# Patient Record
Sex: FEMALE | Race: WHITE | NOT HISPANIC OR LATINO | URBAN - METROPOLITAN AREA
[De-identification: names, ages, dates, MRNs, and addresses within clinical notes are randomized per-mention and may not be internally consistent; named-entity substitution may affect disease eponyms.]

---

## 2024-02-20 ENCOUNTER — APPOINTMENT (OUTPATIENT)
Dept: OPHTHALMOLOGY | Facility: CLINIC | Age: 74
End: 2024-02-20
Payer: MEDICARE

## 2024-02-27 ENCOUNTER — OFFICE VISIT (OUTPATIENT)
Dept: OPHTHALMOLOGY | Facility: CLINIC | Age: 74
End: 2024-02-27
Payer: MEDICARE

## 2024-02-27 DIAGNOSIS — H40.10X0 OPEN-ANGLE GLAUCOMA OF BOTH EYES, UNSPECIFIED GLAUCOMA STAGE, UNSPECIFIED OPEN-ANGLE GLAUCOMA TYPE: Primary | ICD-10-CM

## 2024-02-27 DIAGNOSIS — H53.40 UNSPECIFIED VISUAL FIELD DEFECTS: ICD-10-CM

## 2024-02-27 DIAGNOSIS — H40.89 OTHER GLAUCOMA OF BOTH EYES: ICD-10-CM

## 2024-02-27 PROCEDURE — 92083 EXTENDED VISUAL FIELD XM: CPT | Performed by: OPHTHALMOLOGY

## 2024-02-27 PROCEDURE — 99212 OFFICE O/P EST SF 10 MIN: CPT | Performed by: OPHTHALMOLOGY

## 2024-02-27 PROCEDURE — 92133 CPTRZD OPH DX IMG PST SGM ON: CPT | Performed by: OPHTHALMOLOGY

## 2024-02-27 RX ORDER — LATANOPROST 50 UG/ML
SOLUTION/ DROPS OPHTHALMIC NIGHTLY
COMMUNITY
Start: 2023-06-27

## 2024-02-27 ASSESSMENT — REFRACTION_MANIFEST
METHOD_AUTOREFRACTION: 1
OS_CYLINDER: -2.00
OS_SPHERE: -5.00
OD_ADD: +2.50
OS_ADD: +2.50
OD_SPHERE: -3.50
OD_CYLINDER: -1.25
OS_AXIS: 092
OD_AXIS: 074

## 2024-02-27 ASSESSMENT — REFRACTION_WEARINGRX
OD_SPHERE: -5.75
OD_AXIS: 070
OS_ADD: +2.50
OS_SPHERE: -6.25
OS_AXIS: 110
OS_CYLINDER: -0.50
OD_ADD: +2.50
OD_CYLINDER: -0.50

## 2024-02-27 ASSESSMENT — PACHYMETRY
OD_CT(UM): 532
OS_CT(UM): 514

## 2024-02-27 ASSESSMENT — VISUAL ACUITY
OS_CC: 20/30
CORRECTION_TYPE: GLASSES
OS_PH_CC: 20/25-2
OD_CC: 20/100-2
METHOD: SNELLEN - LINEAR

## 2024-02-27 ASSESSMENT — SLIT LAMP EXAM - LIDS
COMMENTS: NORMAL
COMMENTS: NORMAL

## 2024-02-27 ASSESSMENT — CONF VISUAL FIELD: COMMENTS: SEE HVF

## 2024-02-27 ASSESSMENT — EXTERNAL EXAM - LEFT EYE: OS_EXAM: NORMAL

## 2024-02-27 ASSESSMENT — TONOMETRY
OS_IOP_MMHG: 14
OD_IOP_MMHG: 14
IOP_METHOD: GOLDMANN APPLANATION

## 2024-02-27 ASSESSMENT — CUP TO DISC RATIO
OS_RATIO: 0.6
OD_RATIO: 0.55

## 2024-02-27 ASSESSMENT — GONIOSCOPY: OD_SUPERIOR: 3/360

## 2024-02-27 ASSESSMENT — EXTERNAL EXAM - RIGHT EYE: OD_EXAM: NORMAL

## 2024-08-27 ENCOUNTER — APPOINTMENT (OUTPATIENT)
Dept: OPHTHALMOLOGY | Facility: CLINIC | Age: 74
End: 2024-08-27
Payer: MEDICARE

## 2024-08-27 DIAGNOSIS — H40.10X2 OPEN-ANGLE GLAUCOMA OF BOTH EYES, MODERATE STAGE, UNSPECIFIED OPEN-ANGLE GLAUCOMA TYPE: Primary | ICD-10-CM

## 2024-08-27 PROCEDURE — 99214 OFFICE O/P EST MOD 30 MIN: CPT | Performed by: OPHTHALMOLOGY

## 2024-08-27 ASSESSMENT — CONF VISUAL FIELD
OS_INFERIOR_TEMPORAL_RESTRICTION: 0
OD_INFERIOR_NASAL_RESTRICTION: 0
OD_NORMAL: 1
OD_SUPERIOR_NASAL_RESTRICTION: 0
OS_INFERIOR_NASAL_RESTRICTION: 0
OS_SUPERIOR_TEMPORAL_RESTRICTION: 0
OS_SUPERIOR_NASAL_RESTRICTION: 0
OD_SUPERIOR_TEMPORAL_RESTRICTION: 0
OS_NORMAL: 1
OD_INFERIOR_TEMPORAL_RESTRICTION: 0

## 2024-08-27 ASSESSMENT — ENCOUNTER SYMPTOMS
CONSTITUTIONAL NEGATIVE: 0
GASTROINTESTINAL NEGATIVE: 0
HEMATOLOGIC/LYMPHATIC NEGATIVE: 0
PSYCHIATRIC NEGATIVE: 0
RESPIRATORY NEGATIVE: 0
ALLERGIC/IMMUNOLOGIC NEGATIVE: 0
NEUROLOGICAL NEGATIVE: 0
MUSCULOSKELETAL NEGATIVE: 0
CARDIOVASCULAR NEGATIVE: 0
ENDOCRINE NEGATIVE: 0
EYES NEGATIVE: 0

## 2024-08-27 ASSESSMENT — CUP TO DISC RATIO
OS_RATIO: 0.6
OD_RATIO: 0.55

## 2024-08-27 ASSESSMENT — PACHYMETRY
OS_CT(UM): 514
OD_CT(UM): 532

## 2024-08-27 ASSESSMENT — TONOMETRY
IOP_METHOD: GOLDMANN APPLANATION
OS_IOP_MMHG: 14
OD_IOP_MMHG: 14

## 2024-08-27 ASSESSMENT — SLIT LAMP EXAM - LIDS
COMMENTS: NORMAL
COMMENTS: NORMAL

## 2024-08-27 ASSESSMENT — VISUAL ACUITY
OS_CC: 20/40
CORRECTION_TYPE: GLASSES
OS_PH_CC: 20/25
OD_CC: 20/100
METHOD: SNELLEN - LINEAR
OD_CC+: -2
OS_CC+: -1

## 2024-08-27 ASSESSMENT — GONIOSCOPY
OD_SUPERIOR: 3/360
OS_SUPERIOR: 3/360

## 2024-08-27 ASSESSMENT — EXTERNAL EXAM - RIGHT EYE: OD_EXAM: NORMAL

## 2024-08-27 ASSESSMENT — EXTERNAL EXAM - LEFT EYE: OS_EXAM: NORMAL

## 2024-08-30 ENCOUNTER — OFFICE VISIT (OUTPATIENT)
Dept: OPHTHALMOLOGY | Facility: CLINIC | Age: 74
End: 2024-08-30
Payer: MEDICARE

## 2024-08-30 DIAGNOSIS — H40.10X0 OPEN-ANGLE GLAUCOMA OF BOTH EYES, UNSPECIFIED GLAUCOMA STAGE, UNSPECIFIED OPEN-ANGLE GLAUCOMA TYPE: ICD-10-CM

## 2024-08-30 DIAGNOSIS — H04.123 DRY EYE SYNDROME OF LACRIMAL GLAND, BILATERAL: Primary | ICD-10-CM

## 2024-08-30 DIAGNOSIS — H52.223 MYOPIA OF BOTH EYES WITH REGULAR ASTIGMATISM AND PRESBYOPIA: ICD-10-CM

## 2024-08-30 DIAGNOSIS — H52.13 MYOPIA OF BOTH EYES WITH REGULAR ASTIGMATISM AND PRESBYOPIA: ICD-10-CM

## 2024-08-30 DIAGNOSIS — H52.4 MYOPIA OF BOTH EYES WITH REGULAR ASTIGMATISM AND PRESBYOPIA: ICD-10-CM

## 2024-08-30 DIAGNOSIS — H25.13 AGE-RELATED NUCLEAR CATARACT OF BOTH EYES: ICD-10-CM

## 2024-08-30 PROCEDURE — 92015 DETERMINE REFRACTIVE STATE: CPT | Performed by: STUDENT IN AN ORGANIZED HEALTH CARE EDUCATION/TRAINING PROGRAM

## 2024-08-30 PROCEDURE — 99213 OFFICE O/P EST LOW 20 MIN: CPT | Performed by: STUDENT IN AN ORGANIZED HEALTH CARE EDUCATION/TRAINING PROGRAM

## 2024-08-30 ASSESSMENT — REFRACTION_MANIFEST
OS_AXIS: 067
OS_CYLINDER: -1.00
OD_AXIS: 070
OS_ADD: +2.50
OD_AXIS: 095
OS_SPHERE: -6.50
OS_AXIS: 075
OD_ADD: +2.50
OD_SPHERE: -4.25
OD_SPHERE: -5.75
OD_CYLINDER: -0.50
OS_SPHERE: -7.00
OD_CYLINDER: -1.25
OS_CYLINDER: -1.00

## 2024-08-30 ASSESSMENT — CONF VISUAL FIELD
OS_NORMAL: 1
OS_INFERIOR_TEMPORAL_RESTRICTION: 0
OD_SUPERIOR_TEMPORAL_RESTRICTION: 0
OS_INFERIOR_NASAL_RESTRICTION: 0
OS_SUPERIOR_NASAL_RESTRICTION: 0
OD_INFERIOR_NASAL_RESTRICTION: 0
OD_NORMAL: 1
OS_SUPERIOR_TEMPORAL_RESTRICTION: 0
OD_SUPERIOR_NASAL_RESTRICTION: 0
OD_INFERIOR_TEMPORAL_RESTRICTION: 0

## 2024-08-30 ASSESSMENT — REFRACTION_WEARINGRX
OD_CYLINDER: -0.50
OS_ADD: +2.50
OD_AXIS: 070
OS_CYLINDER: -0.50
OS_SPHERE: -6.25
OD_ADD: +2.50
OD_SPHERE: -5.75
OS_AXIS: 110

## 2024-08-30 ASSESSMENT — CUP TO DISC RATIO
OD_RATIO: 0.55
OS_RATIO: 0.6

## 2024-08-30 ASSESSMENT — VISUAL ACUITY
OD_CC: 20/125
OS_CC: 20/40
CORRECTION_TYPE: GLASSES
METHOD: SNELLEN - LINEAR

## 2024-08-30 ASSESSMENT — TONOMETRY
OD_IOP_MMHG: 14
IOP_METHOD: TONOPEN
OS_IOP_MMHG: 14

## 2024-08-30 ASSESSMENT — ENCOUNTER SYMPTOMS
RESPIRATORY NEGATIVE: 0
CONSTITUTIONAL NEGATIVE: 0
GASTROINTESTINAL NEGATIVE: 0
PSYCHIATRIC NEGATIVE: 0
NEUROLOGICAL NEGATIVE: 0
ALLERGIC/IMMUNOLOGIC NEGATIVE: 0
MUSCULOSKELETAL NEGATIVE: 0
EYES NEGATIVE: 1
ENDOCRINE NEGATIVE: 0
HEMATOLOGIC/LYMPHATIC NEGATIVE: 0
CARDIOVASCULAR NEGATIVE: 0

## 2024-08-30 ASSESSMENT — EXTERNAL EXAM - LEFT EYE: OS_EXAM: NORMAL

## 2024-08-30 ASSESSMENT — PACHYMETRY
OD_CT(UM): 532
OS_CT(UM): 514

## 2024-08-30 ASSESSMENT — EXTERNAL EXAM - RIGHT EYE: OD_EXAM: NORMAL

## 2024-08-30 NOTE — PROGRESS NOTES
Assessment/Plan   Diagnoses and all orders for this visit:  Dry eye syndrome of lacrimal gland, bilateral  Myopia of both eyes with regular astigmatism and presbyopia  Age-related nuclear cataract of both eyes  Open-angle glaucoma of both eyes, unspecified glaucoma stage, unspecified open-angle glaucoma type  -patient referred from Dr. Gaona for evaluation   -updated spec RX today with BCVA OD 20/125 OS 20/25  -hx of amblyopia right eye per patient history-reports patching and VT in childhood-BCVA stable OD today  -cataracts are mild non visually significant at this time  -advised continued use of Restasis, AT's, and warm compresses    RTC with Dr. Gaona 3/2025 as scheduled

## 2025-03-10 ENCOUNTER — APPOINTMENT (OUTPATIENT)
Dept: OPHTHALMOLOGY | Facility: CLINIC | Age: 75
End: 2025-03-10
Payer: MEDICARE

## 2025-03-10 DIAGNOSIS — H54.7 UNSPECIFIED VISUAL LOSS: ICD-10-CM

## 2025-03-10 DIAGNOSIS — H40.10X0 OPEN-ANGLE GLAUCOMA OF BOTH EYES, UNSPECIFIED GLAUCOMA STAGE, UNSPECIFIED OPEN-ANGLE GLAUCOMA TYPE: Primary | ICD-10-CM

## 2025-03-10 PROCEDURE — 92083 EXTENDED VISUAL FIELD XM: CPT | Performed by: OPHTHALMOLOGY

## 2025-03-10 PROCEDURE — 99214 OFFICE O/P EST MOD 30 MIN: CPT | Performed by: OPHTHALMOLOGY

## 2025-03-10 RX ORDER — LATANOPROST 50 UG/ML
1 SOLUTION/ DROPS OPHTHALMIC DAILY
Qty: 2.5 ML | Refills: 3 | Status: SHIPPED | OUTPATIENT
Start: 2025-03-10 | End: 2025-06-08

## 2025-03-10 ASSESSMENT — CUP TO DISC RATIO
OD_RATIO: 0.55
OS_RATIO: 0.6

## 2025-03-10 ASSESSMENT — ENCOUNTER SYMPTOMS
CARDIOVASCULAR NEGATIVE: 0
CONSTITUTIONAL NEGATIVE: 0
ALLERGIC/IMMUNOLOGIC NEGATIVE: 0
PSYCHIATRIC NEGATIVE: 0
MUSCULOSKELETAL NEGATIVE: 0
ENDOCRINE NEGATIVE: 0
GASTROINTESTINAL NEGATIVE: 0
RESPIRATORY NEGATIVE: 0
EYES NEGATIVE: 1
NEUROLOGICAL NEGATIVE: 0
HEMATOLOGIC/LYMPHATIC NEGATIVE: 0

## 2025-03-10 ASSESSMENT — CONF VISUAL FIELD
OS_NORMAL: 1
OD_SUPERIOR_NASAL_RESTRICTION: 0
OS_SUPERIOR_NASAL_RESTRICTION: 0
OD_SUPERIOR_TEMPORAL_RESTRICTION: 0
OD_INFERIOR_TEMPORAL_RESTRICTION: 0
OS_INFERIOR_NASAL_RESTRICTION: 0
OS_INFERIOR_TEMPORAL_RESTRICTION: 0
METHOD: COUNTING FINGERS
OD_INFERIOR_NASAL_RESTRICTION: 0
OD_NORMAL: 1
OS_SUPERIOR_TEMPORAL_RESTRICTION: 0

## 2025-03-10 ASSESSMENT — TONOMETRY
OD_IOP_MMHG: 13
IOP_METHOD: GOLDMANN APPLANATION
OS_IOP_MMHG: 13

## 2025-03-10 ASSESSMENT — REFRACTION_WEARINGRX
OS_AXIS: 075
OD_ADD: +2.50
OD_SPHERE: -5.75
OS_SPHERE: -7.00
OD_CYLINDER: -0.50
OS_ADD: +2.50
OD_AXIS: 070
OS_CYLINDER: -1.00

## 2025-03-10 ASSESSMENT — VISUAL ACUITY
OS_CC+: -2
OS_CC: 20/25
OD_CC: 20/200
CORRECTION_TYPE: GLASSES
METHOD: SNELLEN - LINEAR

## 2025-03-10 ASSESSMENT — GONIOSCOPY
OS_SUPERIOR: 3/360
OD_SUPERIOR: 3/360

## 2025-03-10 ASSESSMENT — EXTERNAL EXAM - LEFT EYE: OS_EXAM: NORMAL

## 2025-03-10 ASSESSMENT — PACHYMETRY
OD_CT(UM): 532
OS_CT(UM): 514

## 2025-03-10 ASSESSMENT — EXTERNAL EXAM - RIGHT EYE: OD_EXAM: NORMAL

## 2025-09-15 ENCOUNTER — APPOINTMENT (OUTPATIENT)
Dept: OPHTHALMOLOGY | Facility: CLINIC | Age: 75
End: 2025-09-15
Payer: MEDICARE